# Patient Record
(demographics unavailable — no encounter records)

---

## 2025-06-01 NOTE — CONSULT LETTER
[Dear  ___] : Dear  [unfilled], [Consult Letter:] : I had the pleasure of evaluating your patient, [unfilled]. [Please see my note below.] : Please see my note below. [Consult Closing:] : Thank you very much for allowing me to participate in the care of this patient.  If you have any questions, please do not hesitate to contact me. [Sincerely,] : Sincerely, [FreeTextEntry3] : Dr Sifuentes

## 2025-06-01 NOTE — ASSESSMENT
[FreeTextEntry1] : Physiologic bowing both tibias  I explained to the parent the natural history of these problems and that the overwhelming majority of children with this type of bowing spontaneously improved.  This child will be observed for a period of time because I explained to the mother that if the child worsens or does not improve by the time she is approximately 2 years of age then from leg bracing may become necessary although very unlikely.  She will return in 4 months for reevaluation.  Encounter time: 33 minutes

## 2025-06-01 NOTE — PHYSICAL EXAM
[FreeTextEntry1] : On physical examination there is a full range of motion of the cervical, thoracic and lumbar spines.  Examination of the upper extremities reveals no obvious abnormalities.  Examination of the hips reveal a full range of motion without evidence of instability there is a negative Gonzalez, Galeazzi and Ortolani signs.  There is no leg length inequality.  Observation of the gait reveals bowing of both lower extremities in the region of the tibias which is considered to be physiologic.  There is a full range of motion of the knees ankles and subtalar joints.  The feet have no obvious abnormalities.

## 2025-06-01 NOTE — REASON FOR VISIT
[Language Line ] : provided by [employer]   [Interpreters_IDNumber] : [id number] [Interpreters_FullName] : Komal [TWNoteComboBox1] : [place]

## 2025-06-01 NOTE — HISTORY OF PRESENT ILLNESS
[FreeTextEntry1] : This 12-year-old female who is the product of a full-term pregnancy and normal vaginal delivery without  complications is here for evaluation of bowing of both lower extremities.  The mother is concerned because the child appears to be walking on the lateral aspect of both feet as well.  She began ambulating recently.